# Patient Record
Sex: FEMALE | Race: WHITE | NOT HISPANIC OR LATINO | Employment: OTHER | URBAN - METROPOLITAN AREA
[De-identification: names, ages, dates, MRNs, and addresses within clinical notes are randomized per-mention and may not be internally consistent; named-entity substitution may affect disease eponyms.]

---

## 2017-01-03 ENCOUNTER — TRANSFERRED RECORDS (OUTPATIENT)
Dept: HEALTH INFORMATION MANAGEMENT | Facility: CLINIC | Age: 72
End: 2017-01-03

## 2017-05-26 ENCOUNTER — HEALTH MAINTENANCE LETTER (OUTPATIENT)
Age: 72
End: 2017-05-26

## 2017-08-04 ENCOUNTER — OFFICE VISIT (OUTPATIENT)
Dept: OPHTHALMOLOGY | Facility: CLINIC | Age: 72
End: 2017-08-04
Payer: COMMERCIAL

## 2017-08-04 DIAGNOSIS — H50.10 EXOTROPIA: ICD-10-CM

## 2017-08-04 DIAGNOSIS — H52.4 PRESBYOPIA: ICD-10-CM

## 2017-08-04 DIAGNOSIS — H25.813 COMBINED FORMS OF AGE-RELATED CATARACT OF BOTH EYES: ICD-10-CM

## 2017-08-04 DIAGNOSIS — Z01.01 ENCOUNTER FOR EXAMINATION OF EYES AND VISION WITH ABNORMAL FINDINGS: Primary | ICD-10-CM

## 2017-08-04 PROCEDURE — 92014 COMPRE OPH EXAM EST PT 1/>: CPT | Performed by: OPHTHALMOLOGY

## 2017-08-04 PROCEDURE — 92015 DETERMINE REFRACTIVE STATE: CPT | Performed by: OPHTHALMOLOGY

## 2017-08-04 ASSESSMENT — EXTERNAL EXAM - LEFT EYE: OS_EXAM: NORMAL

## 2017-08-04 ASSESSMENT — VISUAL ACUITY
METHOD: SNELLEN - LINEAR
OS_CC+: -1
OD_CC: 4
OD_CC: 20/25
OS_CC: 20/20
OS_CC: 2
CORRECTION_TYPE: GLASSES

## 2017-08-04 ASSESSMENT — SLIT LAMP EXAM - LIDS
COMMENTS: NORMAL
COMMENTS: NORMAL

## 2017-08-04 ASSESSMENT — REFRACTION_WEARINGRX
OS_HPRISM: 2 BI
OD_AXIS: 175
OD_ADD: +2.48
SPECS_TYPE: PAL
OS_CYLINDER: +0.50
OD_SPHERE: -1.00
OS_SPHERE: -0.75
OS_AXIS: 018
OD_CYLINDER: +0.50
OS_ADD: +2.49

## 2017-08-04 ASSESSMENT — REFRACTION_MANIFEST
OS_ADD: +2.50
OD_ADD: +2.50
OD_CYLINDER: +1.00
OS_SPHERE: -1.00
OS_AXIS: 018
OS_CYLINDER: +0.75
OD_AXIS: 003
OD_SPHERE: -1.00

## 2017-08-04 ASSESSMENT — CUP TO DISC RATIO
OD_RATIO: 0.3
OS_RATIO: 0.3

## 2017-08-04 ASSESSMENT — CONF VISUAL FIELD
OS_NORMAL: 1
OD_NORMAL: 1

## 2017-08-04 ASSESSMENT — TONOMETRY
IOP_METHOD: APPLANATION
OS_IOP_MMHG: 15
OD_IOP_MMHG: 14

## 2017-08-04 ASSESSMENT — REFRACTION_FINALRX: OS_HPRISM: 2 BI

## 2017-08-04 ASSESSMENT — EXTERNAL EXAM - RIGHT EYE: OD_EXAM: NORMAL

## 2017-08-04 NOTE — MR AVS SNAPSHOT
"              After Visit Summary   8/4/2017    Vickie Sherwood    MRN: [id number]           Patient Information     Date Of Birth          1945        Visit Information        Provider Department      8/4/2017 2:00 PM Yousuf Billy MD [facility]        Today's Diagnoses     Encounter for examination of eyes and vision with abnormal findings    -  1    Presbyopia        Myopia, bilateral        Astigmatism of both eyes, unspecified type        Combined forms of age-related cataract, mild both eyes        Exotropia          Care Instructions    Possible posterior vitreous detachment (sudden onset large floater and/or flashing lights) discussed.   Use artificial tears up to 4 times daily both eyes. (Refresh Tears or Systane Ultra/Balance)   Glasses Rx given - optional   Call in April 2018 for an appointment in August 2018 for Complete Exam    Dr. Billy [phone]          Follow-ups after your visit        Who to contact     If you have questions or need follow up information about today's clinic visit or your schedule please contact [facility] directly at [phone].  Normal or non-critical lab and imaging results will be communicated to you by [employer], letter or phone within 4 business days after the clinic has received the results. If you do not hear from us within 7 days, please contact the clinic through [employer] or phone. If you have a critical or abnormal lab result, we will notify you by phone as soon as possible.  Submit refill requests through [employer] or call your pharmacy and they will forward the refill request to us. Please allow 3 business days for your refill to be completed.          Additional Information About Your Visit        MyChart Information     [employer] lets you send messages to your doctor, view your test results, renew your prescriptions, schedule appointments and more. To sign up, go to [email or web address] . Click on \"Log in\" on the left side " "of the screen, which will take you to the Welcome page. Then click on \"Sign up Now\" on the right side of the page.     You will be asked to enter the access code listed below, as well as some personal information. Please follow the directions to create your username and password.     Your access code is: [email or web address]  Expires: 2017  3:29 PM     Your access code will  in 90 days. If you need help or a new code, please call your [facility] clinic or [phone].        Care EveryWhere ID     This is your Care EveryWhere ID. This could be used by other organizations to access your [facility] medical records  [id number]         Blood Pressure from Last 3 Encounters:   07/10/12 135/79   12 114/72   12 109/61    Weight from Last 3 Encounters:   16 58.5 kg (129 lb)   16 62 kg (136 lb 9.6 oz)   12 67.1 kg (148 lb)              We Performed the Following     EYE EXAM (SIMPLE-NONBILLABLE)     REFRACTIVE STATUS        Primary Care Provider Office Phone # Fax #    Jes Sasha Llanos [phone]       [facility] [address] [facility] [address]  [facility] [address]        Equal Access to Services     TOMASA VARNER AH: Hadii aad ku hadasho Soomaali, waaxda luqadaha, qaybta kaalmada adeegyada, waxay idiin hayaan estela kharadwayne griffin . So [facility] [phone].    ATENCIÓN: Si habla español, tiene a preston disposición servicios gratuitos de asistencia lingüística. Llame al [phone].    We comply with applicable federal civil rights laws and [place] laws. We do not discriminate on the basis of race, color, national origin, age, disability sex, sexual orientation or gender identity.            Thank you!     Thank you for choosing [facility]  for your care. Our goal is always to provide you with excellent care. Hearing back from our patients is one way we can continue to improve our services. Please take a few minutes to complete the written survey that you may receive " in the mail after your visit with us. Thank you!             Your Updated Medication List - Protect others around you: Learn how to safely use, store and throw away your medicines at www.disposemymeds.org.          This list is accurate as of: 8/4/17  3:29 PM.  Always use your most recent med list.                   Brand Name Dispense Instructions for use Diagnosis    amLODIPine 10 MG tablet    NORVASC     Take 10 mg by mouth        aspirin 81 MG tablet     100 Tab    ONE DAILY    Routine general medical examination at a health care facility       atorvastatin 20 MG tablet    LIPITOR    135 tablet    Take 1.5 tablets by mouth daily.    Hyperlipidemia LDL goal < 130       diphenhydrAMINE HCl 50 MG Tabs      Take 300 mg by mouth At Bedtime.        docusate sodium 100 MG tablet    COLACE    60 tablet    Take 100 mg by mouth daily    Other constipation       enalapril 5 MG tablet    VASOTEC    93 tablet    Take 1 tablet by mouth daily. Needs to be seen b/4 further refills for hypertension    Hypertension goal BP (blood pressure) < 130/80       FOLBEE PO      Take  by mouth 2 times daily.        haloperidol 10 MG tablet    HALDOL     Take 10 mg by mouth At Bedtime.        hydrOXYzine 50 MG tablet    ATARAX     Take 50 mg by mouth 4 times daily.        oxybutynin 5 MG 24 hr tablet    DITROPAN XL    90 tablet    Take 1 tablet by mouth daily.    Overactive bladder       pilocarpine 5 MG tablet    SALAGEN    30 tablet    Half tablet (2.5mg) by mouth, once daily before bed    Disturbance of salivary secretion, Stomatitis and mucositis       traZODone HCl 300 MG Tabs      300 mg oral at bedtime.

## 2017-08-04 NOTE — PATIENT INSTRUCTIONS
Possible posterior vitreous detachment (sudden onset large floater and/or flashing lights) discussed.   Use artificial tears up to 4 times daily both eyes. (Refresh Tears or Systane Ultra/Balance)   Glasses Rx given - optional   Call in April 2018 for an appointment in August 2018 for Complete Exam    Dr. Billy [phone]

## 2017-08-04 NOTE — PROGRESS NOTES
Current Eye Medications:  None.      Subjective:  Comprehensive Eye Exam.  Occasionally her vision is blurry and her eyes water.       Objective:  See Ophthalmology Exam.       Assessment:  Stable eye exam.      ICD-10-CM    1. Encounter for examination of eyes and vision with abnormal findings Z01.01 REFRACTIVE STATUS   2. Presbyopia H52.4 REFRACTIVE STATUS   3. Combined forms of age-related cataract, mild, both eyes H25.813 EYE EXAM (SIMPLE-NONBILLABLE)   4. Exotropia H50.10         Plan:  Possible posterior vitreous detachment (sudden onset large floater and/or flashing lights) discussed.   Use artificial tears up to 4 times daily both eyes. (Refresh Tears or Systane Ultra/Balance)   Glasses Rx given - optional   Call in April 2018 for an appointment in August 2018 for Complete Exam    Dr. Billy [phone]

## 2017-08-05 PROBLEM — H25.813 COMBINED FORMS OF AGE-RELATED CATARACT OF BOTH EYES: Status: ACTIVE | Noted: 2017-08-05

## 2017-12-26 ENCOUNTER — TELEPHONE (OUTPATIENT)
Dept: ORTHOPEDICS | Facility: CLINIC | Age: 72
End: 2017-12-26

## 2018-01-09 ENCOUNTER — TRANSFERRED RECORDS (OUTPATIENT)
Dept: HEALTH INFORMATION MANAGEMENT | Facility: CLINIC | Age: 73
End: 2018-01-09

## 2018-08-07 ENCOUNTER — OFFICE VISIT (OUTPATIENT)
Dept: OPHTHALMOLOGY | Facility: CLINIC | Age: 73
End: 2018-08-07
Payer: COMMERCIAL

## 2018-08-07 DIAGNOSIS — H25.813 COMBINED FORMS OF AGE-RELATED CATARACT OF BOTH EYES: ICD-10-CM

## 2018-08-07 DIAGNOSIS — H50.10 EXOTROPIA: ICD-10-CM

## 2018-08-07 DIAGNOSIS — Z01.01 ENCOUNTER FOR EXAMINATION OF EYES AND VISION WITH ABNORMAL FINDINGS: Primary | ICD-10-CM

## 2018-08-07 DIAGNOSIS — H52.4 PRESBYOPIA: ICD-10-CM

## 2018-08-07 PROCEDURE — 92014 COMPRE OPH EXAM EST PT 1/>: CPT | Performed by: OPHTHALMOLOGY

## 2018-08-07 PROCEDURE — 92015 DETERMINE REFRACTIVE STATE: CPT | Performed by: OPHTHALMOLOGY

## 2018-08-07 ASSESSMENT — REFRACTION_MANIFEST
OS_CYLINDER: +0.75
OD_SPHERE: -1.00
OS_ADD: +2.50
OD_ADD: +2.50
OS_AXIS: 015
OD_AXIS: 005
OS_SPHERE: -1.00
OD_CYLINDER: +0.75

## 2018-08-07 ASSESSMENT — VISUAL ACUITY
OS_CC: J1
OD_CC: 20/20
OS_CC: 20/20
OD_CC: J1
METHOD: SNELLEN - LINEAR

## 2018-08-07 ASSESSMENT — REFRACTION_WEARINGRX
OD_SPHERE: -1.00
OS_ADD: +2.49
OS_CYLINDER: +0.75
OD_AXIS: 008
OD_CYLINDER: +0.50
OS_HPRISM: 2 BI
SPECS_TYPE: PAL
OS_SPHERE: -0.75
OS_AXIS: 018
OD_ADD: +2.48

## 2018-08-07 ASSESSMENT — EXTERNAL EXAM - RIGHT EYE: OD_EXAM: NORMAL

## 2018-08-07 ASSESSMENT — CONF VISUAL FIELD
OS_NORMAL: 1
OD_NORMAL: 1

## 2018-08-07 ASSESSMENT — TONOMETRY
OD_IOP_MMHG: 18
IOP_METHOD: APPLANATION
OS_IOP_MMHG: 18

## 2018-08-07 ASSESSMENT — CUP TO DISC RATIO
OD_RATIO: 0.3
OS_RATIO: 0.3

## 2018-08-07 ASSESSMENT — SLIT LAMP EXAM - LIDS
COMMENTS: NORMAL
COMMENTS: NORMAL

## 2018-08-07 ASSESSMENT — EXTERNAL EXAM - LEFT EYE: OS_EXAM: NORMAL

## 2018-08-07 NOTE — PATIENT INSTRUCTIONS
Glasses Rx given - optional  Use artificial tears up to 4 times daily both eyes.  (Refresh Tears, Systane Ultra/Balance, or Theratears)  Possible posterior vitreous detachment (sudden onset large floater and/or flashing lights) discussed.  Call in April 2019 for an appointment in August 2019 for Complete Exam    Dr. Billy [phone]

## 2018-08-07 NOTE — LETTER
8/7/2018         RE: Vickie Sherwood  [address]  [place] [address]        Dear Colleague,    Thank you for referring your patient, Vickie Sherwood, to the [facility]. Please see a copy of my visit note below.     Current Eye Medications:  no     Subjective:  Comprehensive eye exam.   Pt states she seems to be seeing fine , has Pals that bows broke on them she uses to read with , she will hold them up to her by hand, pt does not wear any glasses for distance. Pt states had double vision at one time, no longer does.     Objective:  See Ophthalmology Exam.       Assessment:  Stable eye exam.      ICD-10-CM    1. Encounter for examination of eyes and vision with abnormal findings Z01.01 REFRACTIVE STATUS   2. Presbyopia H52.4 REFRACTIVE STATUS   3. Combined forms of age-related cataract, mild, both eyes H25.813 EYE EXAM (SIMPLE-NONBILLABLE)   4. Exotropia H50.10         Plan:  Glasses Rx given - optional  Use artificial tears up to 4 times daily both eyes.  (Refresh Tears, Systane Ultra/Balance, or Theratears)  Possible posterior vitreous detachment (sudden onset large floater and/or flashing lights) discussed.  Call in April 2019 for an appointment in August 2019 for Complete Exam    Dr. Billy [phone]         Again, thank you for allowing me to participate in the care of your patient.        Sincerely,        Yousuf Billy MD

## 2018-08-07 NOTE — PROGRESS NOTES
Current Eye Medications:  no     Subjective:  Comprehensive eye exam.   Pt states she seems to be seeing fine , has Pals that bows broke on them she uses to read with , she will hold them up to her by hand, pt does not wear any glasses for distance. Pt states had double vision at one time, no longer does.     Objective:  See Ophthalmology Exam.       Assessment:  Stable eye exam.      ICD-10-CM    1. Encounter for examination of eyes and vision with abnormal findings Z01.01 REFRACTIVE STATUS   2. Presbyopia H52.4 REFRACTIVE STATUS   3. Combined forms of age-related cataract, mild, both eyes H25.813 EYE EXAM (SIMPLE-NONBILLABLE)   4. Exotropia H50.10         Plan:  Glasses Rx given - optional  Use artificial tears up to 4 times daily both eyes.  (Refresh Tears, Systane Ultra/Balance, or Theratears)  Possible posterior vitreous detachment (sudden onset large floater and/or flashing lights) discussed.  Call in April 2019 for an appointment in August 2019 for Complete Exam    Dr. Billy [phone]

## 2018-08-07 NOTE — MR AVS SNAPSHOT
After Visit Summary   8/7/2018    Vickie Sherwood    MRN: [id number]           Patient Information     Date Of Birth          1945        Visit Information        Provider Department      8/7/2018 3:00 PM Yousuf Billy MD [facility]        Today's Diagnoses     Encounter for examination of eyes and vision with abnormal findings    -  1    Presbyopia          Care Instructions    Glasses Rx given - optional  Use artificial tears up to 4 times daily both eyes.  (Refresh Tears, Systane Ultra/Balance, or Theratears)  Possible posterior vitreous detachment (sudden onset large floater and/or flashing lights) discussed.  Call in April 2019 for an appointment in August 2019 for Complete Exam    Dr. Billy [phone]          Follow-ups after your visit        Who to contact     If you have questions or need follow up information about today's clinic visit or your schedule please contact [facility] directly at [phone].  Normal or non-critical lab and imaging results will be communicated to you by MyChart, letter or phone within 4 business days after the clinic has received the results. If you do not hear from us within 7 days, please contact the clinic through [employer] or phone. If you have a critical or abnormal lab result, we will notify you by phone as soon as possible.  Submit refill requests through [employer] or call your pharmacy and they will forward the refill request to us. Please allow 3 business days for your refill to be completed.          Additional Information About Your Visit        Care EveryWhere ID     This is your Care EveryWhere ID. This could be used by other organizations to access your [place] medical records  [id number]         Blood Pressure from Last 3 Encounters:   07/10/12 135/79   07/05/12 114/72   02/29/12 109/61    Weight from Last 3 Encounters:   08/29/16 58.5 kg (129 lb)   04/25/16 62 kg (136 lb 9.6 oz)   11/16/12 67.1 kg (148 lb)               Today, you had the following     No orders found for display       Primary Care Provider Office Phone # Fax #    Jes Sasha Llanos [phone]       [facility] [address] [facility] [address]  [facility] [address]        Equal Access to Services     TOMASA VARNER : Hadii aad ku hadasho Soomaali, waaxda luqadaha, qaybta kaalmada adeegyada, waxay idiin hayaan adeeg khantoniettash lasocorro . So [facility] [phone].    ATENCIÓN: Si habla español, tiene a preston disposición servicios gratuitos de asistencia lingüística. Luis Fernando al [phone].    We comply with applicable federal civil rights laws and [place] laws. We do not discriminate on the basis of race, color, national origin, age, disability, sex, sexual orientation, or gender identity.            Thank you!     Thank you for choosing [facility]  for your care. Our goal is always to provide you with excellent care. Hearing back from our patients is one way we can continue to improve our services. Please take a few minutes to complete the written survey that you may receive in the mail after your visit with us. Thank you!             Your Updated Medication List - Protect others around you: Learn how to safely use, store and throw away your medicines at www.disposemymeds.org.          This list is accurate as of 8/7/18  4:03 PM.  Always use your most recent med list.                   Brand Name Dispense Instructions for use Diagnosis    amLODIPine 10 MG tablet    NORVASC     Take 10 mg by mouth        aspirin 81 MG tablet     100 Tab    ONE DAILY    Routine general medical examination at a health care facility       atorvastatin 20 MG tablet    LIPITOR    135 tablet    Take 1.5 tablets by mouth daily.    Hyperlipidemia LDL goal < 130       diphenhydrAMINE HCl 50 MG Tabs      Take 300 mg by mouth At Bedtime.        docusate sodium 100 MG tablet    COLACE    60 tablet    Take 100 mg by mouth daily    Other constipation       enalapril  5 MG tablet    VASOTEC    93 tablet    Take 1 tablet by mouth daily. Needs to be seen b/4 further refills for hypertension    Hypertension goal BP (blood pressure) < 130/80       FOLBEE PO      Take  by mouth 2 times daily.        haloperidol 10 MG tablet    HALDOL     Take 10 mg by mouth At Bedtime.        hydrOXYzine 50 MG tablet    ATARAX     Take 50 mg by mouth 4 times daily.        oxybutynin 5 MG 24 hr tablet    DITROPAN XL    90 tablet    Take 1 tablet by mouth daily.    Overactive bladder       pilocarpine 5 MG tablet    SALAGEN    30 tablet    Half tablet (2.5mg) by mouth, once daily before bed    Disturbance of salivary secretion, Stomatitis and mucositis       traZODone HCl 300 MG Tabs      300 mg oral at bedtime.

## 2020-12-29 ENCOUNTER — OFFICE VISIT (OUTPATIENT)
Dept: OPHTHALMOLOGY | Facility: CLINIC | Age: 75
End: 2020-12-29
Payer: COMMERCIAL

## 2020-12-29 DIAGNOSIS — H25.813 COMBINED FORMS OF AGE-RELATED CATARACT OF BOTH EYES: ICD-10-CM

## 2020-12-29 DIAGNOSIS — Z01.01 ENCOUNTER FOR EXAMINATION OF EYES AND VISION WITH ABNORMAL FINDINGS: Primary | ICD-10-CM

## 2020-12-29 DIAGNOSIS — H52.4 PRESBYOPIA: ICD-10-CM

## 2020-12-29 DIAGNOSIS — H50.10 EXOTROPIA: ICD-10-CM

## 2020-12-29 PROCEDURE — 92014 COMPRE OPH EXAM EST PT 1/>: CPT | Performed by: OPHTHALMOLOGY

## 2020-12-29 PROCEDURE — 92015 DETERMINE REFRACTIVE STATE: CPT | Performed by: OPHTHALMOLOGY

## 2020-12-29 ASSESSMENT — EXTERNAL EXAM - RIGHT EYE: OD_EXAM: NORMAL

## 2020-12-29 ASSESSMENT — VISUAL ACUITY
OS_PH_CC: J1
OD_CC: 20/25+3
OS_CC: 20/20
OD_PH_CC: J2
METHOD: SNELLEN - LINEAR

## 2020-12-29 ASSESSMENT — EXTERNAL EXAM - LEFT EYE: OS_EXAM: NORMAL

## 2020-12-29 ASSESSMENT — REFRACTION_MANIFEST
OS_SPHERE: -1.00
OD_ADD: +2.75
OS_ADD: +2.75
OD_AXIS: 005
OS_AXIS: 015
OD_CYLINDER: +0.75
OD_SPHERE: -0.50
OS_CYLINDER: +0.75

## 2020-12-29 ASSESSMENT — REFRACTION_WEARINGRX
OS_CYLINDER: +1.00
OD_ADD: +2.50
OD_AXIS: 008
OD_CYLINDER: +0.75
OS_SPHERE: -1.00
OS_ADD: +2.50
OS_AXIS: 015
OD_SPHERE: -1.00
SPECS_TYPE: PAL

## 2020-12-29 ASSESSMENT — CONF VISUAL FIELD
OD_NORMAL: 1
OS_NORMAL: 1

## 2020-12-29 ASSESSMENT — SLIT LAMP EXAM - LIDS
COMMENTS: NORMAL
COMMENTS: NORMAL

## 2020-12-29 ASSESSMENT — TONOMETRY
OS_IOP_MMHG: 15
OD_IOP_MMHG: 17
IOP_METHOD: APPLANATION

## 2020-12-29 ASSESSMENT — CUP TO DISC RATIO
OD_RATIO: 0.3
OS_RATIO: 0.3

## 2020-12-29 NOTE — PATIENT INSTRUCTIONS
Possible posterior vitreous detachment (sudden onset large floater and/or flashing lights) both eyes discussed.   Glasses Rx given - optional   Use artificial tears up to 4 times daily both eyes as needed (Refresh Tears, Systane Ultra/Balance, or Theratears)   Call in August 2021 for an appointment in December 2021 for Complete Exam    Dr. Billy [phone]

## 2020-12-29 NOTE — PROGRESS NOTES
Current Eye Medications: no      Subjective:  Comprehensive eye exam.   Pt reports that in the  evening it is harder to see to read.     Objective:  See Ophthalmology Exam.       Assessment:  Stable eye exam.      ICD-10-CM    1. Encounter for examination of eyes and vision with abnormal findings  Z01.01    2. Presbyopia  H52.4 REFRACTIVE STATUS     EYE EXAM (SIMPLE-NONBILLABLE)   3. Combined forms of age-related cataract, mild, both eyes  H25.813    4. Exotropia  H50.10         Plan:  Possible posterior vitreous detachment (sudden onset large floater and/or flashing lights) both eyes discussed.   Glasses Rx given - optional   Use artificial tears up to 4 times daily both eyes as needed (Refresh Tears, Systane Ultra/Balance, or Theratears)   Call in August 2021 for an appointment in December 2021 for Complete Exam    Dr. Billy [phone]

## 2020-12-29 NOTE — LETTER
12/29/2020         RE: Vickie Sherwood  [address]  [place] [address]        Dear Colleague,    Thank you for referring your patient, Vickie Sherwood, to the [facility]. Please see a copy of my visit note below.     Current Eye Medications: no      Subjective:  Comprehensive eye exam.   Pt reports that in the  evening it is harder to see to read.     Objective:  See Ophthalmology Exam.       Assessment:  Stable eye exam.      ICD-10-CM    1. Encounter for examination of eyes and vision with abnormal findings  Z01.01    2. Presbyopia  H52.4 REFRACTIVE STATUS     EYE EXAM (SIMPLE-NONBILLABLE)   3. Combined forms of age-related cataract, mild, both eyes  H25.813    4. Exotropia  H50.10         Plan:  Possible posterior vitreous detachment (sudden onset large floater and/or flashing lights) both eyes discussed.   Glasses Rx given - optional   Use artificial tears up to 4 times daily both eyes as needed (Refresh Tears, Systane Ultra/Balance, or Theratears)   Call in August 2021 for an appointment in December 2021 for Complete Exam    Dr. Billy [phone]           Again, thank you for allowing me to participate in the care of your patient.        Sincerely,        Yousuf Billy MD

## 2021-02-17 ENCOUNTER — TELEPHONE (OUTPATIENT)
Dept: OPHTHALMOLOGY | Facility: CLINIC | Age: 76
End: 2021-02-17

## 2021-02-18 NOTE — TELEPHONE ENCOUNTER
"Patient called stating that she got blurry vision at the store all of a sudden and her eyes started \"jiggling\". She states it was accompanied by a headache. She then stated that she got a few floaters too with bid dark areas in her vision. She would like a call back to discuss these symptoms as soon as possible. She would like a call back at: [phone].    Thank You,    Rickie Barraza  "
Called patient twice; no answer.  Left message that if new floater in one eye, should be seen for dilation.  Yousuf Billy M.D.  [phone]    
None known

## 2021-03-08 ENCOUNTER — OFFICE VISIT (OUTPATIENT)
Dept: OPHTHALMOLOGY | Facility: CLINIC | Age: 76
End: 2021-03-08
Payer: COMMERCIAL

## 2021-03-08 DIAGNOSIS — G43.109 MIGRAINE EQUIVALENT SYNDROME: Primary | ICD-10-CM

## 2021-03-08 PROCEDURE — 92012 INTRM OPH EXAM EST PATIENT: CPT | Performed by: OPHTHALMOLOGY

## 2021-03-08 ASSESSMENT — TONOMETRY
OD_IOP_MMHG: 18
OS_IOP_MMHG: 17
IOP_METHOD: APPLANATION

## 2021-03-08 ASSESSMENT — SLIT LAMP EXAM - LIDS
COMMENTS: NORMAL
COMMENTS: NORMAL

## 2021-03-08 ASSESSMENT — VISUAL ACUITY
OD_CC: 20/25
METHOD: SNELLEN - LINEAR
CORRECTION_TYPE: GLASSES
OS_CC: 20/20

## 2021-03-08 ASSESSMENT — EXTERNAL EXAM - RIGHT EYE: OD_EXAM: NORMAL

## 2021-03-08 ASSESSMENT — CONF VISUAL FIELD
OD_NORMAL: 1
OS_NORMAL: 1

## 2021-03-08 ASSESSMENT — CUP TO DISC RATIO
OS_RATIO: 0.3
OD_RATIO: 0.3

## 2021-03-08 ASSESSMENT — EXTERNAL EXAM - LEFT EYE: OS_EXAM: NORMAL

## 2021-03-08 NOTE — PROGRESS NOTES
Current Eye Medications:  None     Subjective:  TA/Dil (floaters).   Patient states that she has had episode 2/2/21 of vision becoming dark lasting for approximately 15 minutes.  Then she had additional episodes lasting up to 1/2 hour where vision jiggled and she saw scintillating lights No headaches, but had a short lived pain at back of head lasting only a few seconds.    Episodes were binocular, not monocular.     Objective:  See Ophthalmology Exam.       Assessment:  Possible migraine equivalent episodes.      Plan:  Your eye exam today is unremarkable.  The episodes may be migraine equivalents or other brain related phenomena.  Check your blood pressure frequently.  Start an 81 mg aspirin if okay with your PCP.  Return visit as planned in Dec 2021.  Yousuf Billy M.D.  [phone]

## 2021-03-08 NOTE — PATIENT INSTRUCTIONS
Your eye exam today is unremarkable.  The episodes may be migraine equivalents or other brain related phenomena.  Start an 81 mg aspirin if okay with your PCP.  Return visit as planned in Dec 2021.  Yousuf Billy M.D.  [phone]

## 2021-03-08 NOTE — LETTER
3/8/2021         RE: Vickie Sherwood  [address]  [place] [address]        Dear Colleague,    Thank you for referring your patient, Vickie Sherwood, to the [facility]. Please see a copy of my visit note below.     Current Eye Medications:  None     Subjective:  TA/Dil (floaters).   Patient states that she has had episode 2/2/21 of vision becoming dark lasting for approximately 15 minutes.  Then she had additional episodes lasting up to 1/2 hour where vision jiggled and she saw scintillating lights No headaches, but had a short lived pain at back of head lasting only a few seconds.     Objective:  See Ophthalmology Exam.       Assessment:  Possible migraine equivalent episodes.      Plan:  Your eye exam today is unremarkable.  The episodes may be migraine equivalents or other brain related phenomena.  Check your blood pressure frequently.  Start an 81 mg aspirin if okay with your PCP.  Return visit as planned in Dec 2021.  Yousuf Billy M.D.  [phone]           Again, thank you for allowing me to participate in the care of your patient.        Sincerely,        Yousuf Billy MD

## 2021-03-09 PROBLEM — G43.109 MIGRAINE EQUIVALENT SYNDROME: Status: ACTIVE | Noted: 2021-03-09

## 2021-03-10 ENCOUNTER — DOCUMENTATION ONLY (OUTPATIENT)
Dept: OPHTHALMOLOGY | Facility: CLINIC | Age: 76
End: 2021-03-10

## 2023-07-31 ENCOUNTER — OFFICE VISIT (OUTPATIENT)
Dept: OPHTHALMOLOGY | Facility: CLINIC | Age: 78
End: 2023-07-31
Payer: COMMERCIAL

## 2023-07-31 DIAGNOSIS — Z01.01 ENCOUNTER FOR EXAMINATION OF EYES AND VISION WITH ABNORMAL FINDINGS: Primary | ICD-10-CM

## 2023-07-31 DIAGNOSIS — H25.813 COMBINED FORMS OF AGE-RELATED CATARACT OF BOTH EYES: ICD-10-CM

## 2023-07-31 DIAGNOSIS — H35.52 MACULAR PIGMENT DEPOSIT: ICD-10-CM

## 2023-07-31 DIAGNOSIS — H52.4 PRESBYOPIA: ICD-10-CM

## 2023-07-31 DIAGNOSIS — H35.61 RETINAL HEMORRHAGE OF RIGHT EYE: ICD-10-CM

## 2023-07-31 PROCEDURE — 92015 DETERMINE REFRACTIVE STATE: CPT | Performed by: OPHTHALMOLOGY

## 2023-07-31 PROCEDURE — 92134 CPTRZ OPH DX IMG PST SGM RTA: CPT | Performed by: OPHTHALMOLOGY

## 2023-07-31 PROCEDURE — 92014 COMPRE OPH EXAM EST PT 1/>: CPT | Performed by: OPHTHALMOLOGY

## 2023-07-31 RX ORDER — DIVALPROEX SODIUM 250 MG/1
750 TABLET, EXTENDED RELEASE ORAL AT BEDTIME
COMMUNITY
Start: 2023-07-02

## 2023-07-31 RX ORDER — QUETIAPINE FUMARATE 25 MG/1
25 TABLET, FILM COATED ORAL AT BEDTIME
COMMUNITY
Start: 2023-06-21

## 2023-07-31 RX ORDER — FLUTICASONE FUROATE AND VILANTEROL TRIFENATATE 100; 25 UG/1; UG/1
1 POWDER RESPIRATORY (INHALATION) DAILY
COMMUNITY
Start: 2023-07-17

## 2023-07-31 RX ORDER — LORAZEPAM 0.5 MG/1
0.5 TABLET ORAL AT BEDTIME
COMMUNITY
Start: 2023-07-05

## 2023-07-31 RX ORDER — ATORVASTATIN CALCIUM 10 MG/1
10 TABLET, FILM COATED ORAL DAILY
COMMUNITY
Start: 2023-05-30

## 2023-07-31 RX ORDER — DOCUSATE SODIUM 50MG AND SENNOSIDES 8.6MG 8.6; 5 MG/1; MG/1
1 TABLET, FILM COATED ORAL 2 TIMES DAILY PRN
COMMUNITY
Start: 2023-04-10

## 2023-07-31 RX ORDER — ASENAPINE 10 MG/1
10 TABLET SUBLINGUAL 2 TIMES DAILY
COMMUNITY
Start: 2023-06-27

## 2023-07-31 RX ORDER — ALBUTEROL SULFATE 90 UG/1
1-2 AEROSOL, METERED RESPIRATORY (INHALATION) EVERY 4 HOURS PRN
COMMUNITY

## 2023-07-31 ASSESSMENT — CONF VISUAL FIELD
OS_INFERIOR_TEMPORAL_RESTRICTION: 0
OS_NORMAL: 1
OD_SUPERIOR_NASAL_RESTRICTION: 0
OD_NORMAL: 1
OD_INFERIOR_TEMPORAL_RESTRICTION: 0
OD_SUPERIOR_TEMPORAL_RESTRICTION: 0
OS_SUPERIOR_NASAL_RESTRICTION: 0
OS_INFERIOR_NASAL_RESTRICTION: 0
METHOD: COUNTING FINGERS
OD_INFERIOR_NASAL_RESTRICTION: 0
OS_SUPERIOR_TEMPORAL_RESTRICTION: 0

## 2023-07-31 ASSESSMENT — REFRACTION_MANIFEST
OS_ADD: +2.50
OS_CYLINDER: +0.75
OS_AXIS: 005
OD_CYLINDER: +0.75
OS_SPHERE: -1.50
OD_AXIS: 005
OD_ADD: +2.50
OD_SPHERE: -0.50

## 2023-07-31 ASSESSMENT — CUP TO DISC RATIO
OD_RATIO: 0.3
OS_RATIO: 0.3

## 2023-07-31 ASSESSMENT — EXTERNAL EXAM - RIGHT EYE: OD_EXAM: NORMAL

## 2023-07-31 ASSESSMENT — VISUAL ACUITY
OS_CC+: -2
OS_CC: 20/30
METHOD: SNELLEN - LINEAR
OD_PH_CC: 20/40
OD_CC+: -2
OD_CC: 20/40
CORRECTION_TYPE: GLASSES

## 2023-07-31 ASSESSMENT — REFRACTION_WEARINGRX
OD_ADD: +2.40
OS_SPHERE: -1.00
OD_CYLINDER: +0.75
OS_AXIS: 015
OD_SPHERE: -1.00
OS_ADD: +2.40
SPECS_TYPE: PAL
OS_CYLINDER: +0.75
OD_AXIS: 003

## 2023-07-31 ASSESSMENT — EXTERNAL EXAM - LEFT EYE: OS_EXAM: NORMAL

## 2023-07-31 ASSESSMENT — SLIT LAMP EXAM - LIDS
COMMENTS: 1+ DERMATOCHALASIS
COMMENTS: 1+ DERMATOCHALASIS

## 2023-07-31 ASSESSMENT — TONOMETRY
IOP_METHOD: APPLANATION
OS_IOP_MMHG: 13
OD_IOP_MMHG: 13

## 2023-07-31 NOTE — PROGRESS NOTES
" Current Eye Medications:  none     Subjective:  comprehensive eye exam - glasses work well for distance and near.  Gets intermittent discomfort (believes right eye, not sure) lasting a few seconds, self resolves. Not using drops.     Only gets double if laying down and watching tv to the side - no double vision otherwise.  Does notice wavy lines when looking at air conditioner unit that has little small squares. Wavy lines do not move.      Objective:  See Ophthalmology Exam.       Assessment:  Isolated peripheral retinal blot heme right eye; otherwise stable eye exam.      ICD-10-CM    1. Encounter for examination of eyes and vision with abnormal findings  Z01.01       2. Presbyopia  H52.4       3. Combined forms of age-related cataract, mild, both eyes  H25.813       4. 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 Retinal hemorrhage of right eye  H35.61            Plan:  Glasses prescription given - optional  May use artificial tears up to four times a day (like Refresh Optive, Systane Balance, TheraTears, or generic artificial tears are ok. Avoid \"get the red out\" drops).   Possible posterior vitreous detachment (sudden onset large floater and/or flashing lights) both eyes discussed.  Obtain OCT today - will call with any concerns.    Call in March 2024 for an appointment in July 2024 for Complete Exam    Dr. Billy [phone]         "

## 2023-07-31 NOTE — LETTER
"    7/31/2023         RE: Vickie Sherwood  [address]  [place] [address]        Dear Colleague,    Thank you for referring your patient, Vickie Sherwood, to the [facility]. Please see a copy of my visit note below.     Current Eye Medications:  none     Subjective:  comprehensive eye exam - glasses work well for distance and near.  Gets intermittent discomfort (believes right eye, not sure) lasting a few seconds, self resolves. Not using drops.     Only gets double if laying down and watching tv to the side - no double vision otherwise.  Does notice wavy lines when looking at air conditioner unit that has little small squares. Wavy lines do not move.      Objective:  See Ophthalmology Exam.       Assessment:  Isolated peripheral retinal blot heme right eye; otherwise stable eye exam.      ICD-10-CM    1. Encounter for examination of eyes and vision with abnormal findings  Z01.01       2. Presbyopia  H52.4       3. Combined forms of age-related cataract, mild, both eyes  H25.813       4. 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 Retinal hemorrhage of right eye  H35.61            Plan:  Glasses prescription given - optional  May use artificial tears up to four times a day (like Refresh Optive, Systane Balance, TheraTears, or generic artificial tears are ok. Avoid \"get the red out\" drops).   Possible posterior vitreous detachment (sudden onset large floater and/or flashing lights) both eyes discussed.  Obtain OCT today - will call with any concerns.    Call in March 2024 for an appointment in July 2024 for Complete Exam    Dr. Billy [phone]         Again, thank you for allowing me to participate in the care of your patient.        Sincerely,        Yousuf Billy MD  "

## 2023-07-31 NOTE — PATIENT INSTRUCTIONS
"Glasses prescription given - optional  May use artificial tears up to four times a day (like Refresh Optive, Systane Balance, TheraTears, or generic artificial tears are ok. Avoid \"get the red out\" drops).   Possible posterior vitreous detachment (sudden onset large floater and/or flashing lights) both eyes discussed.  Obtain OCT today - will call with any concerns.    Call in March 2024 for an appointment in July 2024 for Complete Exam    Dr. Billy [phone]    "

## 2023-08-26 PROBLEM — H35.52 MACULAR PIGMENT DEPOSIT: Status: ACTIVE | Noted: 2023-08-26

## 2024-04-29 ENCOUNTER — TELEPHONE (OUTPATIENT)
Dept: OPHTHALMOLOGY | Facility: CLINIC | Age: 79
End: 2024-04-29
Payer: COMMERCIAL

## 2024-04-29 NOTE — TELEPHONE ENCOUNTER
[facility] Call Center    Phone Message    May a detailed message be left on voicemail: yes     Reason for Call: Symptoms or Concerns     If patient has red-flag symptoms, warm transfer to triage line    Current symptom or concern: gradual eye pain     Symptoms have been present for:  2-4 month(s)    Has patient previously been seen for this? No    By : Dr Billy    Date: 7/31/2023    Are there any new or worsening symptoms? Yes: Patient would like Dr Billy to know that she's having some gradual eye pain that comes and goes but that it doesn't hurt much.    Patient can be reached at [phone]. Thank you.    Action Taken: Message routed to:  Other: [facility]    Travel Screening: Not Applicable

## 2024-04-29 NOTE — TELEPHONE ENCOUNTER
Spoke with patient - she is having some discomfort in each eye which has been having more often (usually every day).  The discomfort lasts about one second, then subsides.  No vision changes.  Other people have noticed some mild redness of her eyes.  She is not using any drops.    I explained to her that Dr. Billy recommended artificial tears four times a day at her last visit.  I gave her the name of Refresh and Systane.  She will try this and let us know if her symptoms worsen.   She has a Comprehensive Eye Exam scheduled with Dr. Billy on 8-1-24.

## 2024-07-31 ENCOUNTER — TELEPHONE (OUTPATIENT)
Dept: OPHTHALMOLOGY | Facility: CLINIC | Age: 79
End: 2024-07-31
Payer: COMMERCIAL

## 2024-07-31 NOTE — TELEPHONE ENCOUNTER
Explained to patient that it is important to keep annual eye exams - has previous diagnosis of cataracts and macula changes. Pt also mentions that her  is blind of syphilis, states that he also gave pt her syphilis.     Strongly recommend pt keep her appt on 8/29/24 and to discuss this with provider at next visit in 1 mo. (Denies any vision changes as compared to last year).

## 2024-07-31 NOTE — TELEPHONE ENCOUNTER
[facility] Call Center    Phone Message    May a detailed message be left on voicemail: yes     Reason for Call: Other: Pt is requesting to speak with someone on 's team. States she'll like to know why  wants her coming in every year     Please reach out to pt and advise.    Thank you!    Action Taken: Message routed to:  [facility] eye    Travel Screening: Not Applicable     Date of Service:

## 2024-08-29 ENCOUNTER — OFFICE VISIT (OUTPATIENT)
Dept: OPHTHALMOLOGY | Facility: CLINIC | Age: 79
End: 2024-08-29
Payer: COMMERCIAL

## 2024-08-29 DIAGNOSIS — H52.4 PRESBYOPIA: ICD-10-CM

## 2024-08-29 DIAGNOSIS — Z01.01 ENCOUNTER FOR EXAMINATION OF EYES AND VISION WITH ABNORMAL FINDINGS: Primary | ICD-10-CM

## 2024-08-29 DIAGNOSIS — H35.52 MACULAR PIGMENT DEPOSIT: ICD-10-CM

## 2024-08-29 DIAGNOSIS — H25.813 COMBINED FORMS OF AGE-RELATED CATARACT OF BOTH EYES: ICD-10-CM

## 2024-08-29 PROCEDURE — 92015 DETERMINE REFRACTIVE STATE: CPT | Performed by: OPHTHALMOLOGY

## 2024-08-29 PROCEDURE — 92014 COMPRE OPH EXAM EST PT 1/>: CPT | Performed by: OPHTHALMOLOGY

## 2024-08-29 ASSESSMENT — VISUAL ACUITY
METHOD: SNELLEN - LINEAR
OS_CC: 20/25
OS_CC: J1 (+3)
OD_CC: J1
OD_CC+: +3
OD_CC: 20/30

## 2024-08-29 ASSESSMENT — CONF VISUAL FIELD
OD_NORMAL: 1
OD_SUPERIOR_NASAL_RESTRICTION: 0
OS_SUPERIOR_NASAL_RESTRICTION: 0
OD_SUPERIOR_TEMPORAL_RESTRICTION: 0
OS_INFERIOR_NASAL_RESTRICTION: 0
OD_INFERIOR_TEMPORAL_RESTRICTION: 0
OS_INFERIOR_TEMPORAL_RESTRICTION: 0
OS_NORMAL: 1
OS_SUPERIOR_TEMPORAL_RESTRICTION: 0
OD_INFERIOR_NASAL_RESTRICTION: 0

## 2024-08-29 ASSESSMENT — TONOMETRY
OD_IOP_MMHG: 14
OS_IOP_MMHG: 12
IOP_METHOD: APPLANATION

## 2024-08-29 ASSESSMENT — REFRACTION_MANIFEST
OD_ADD: +3.00
OD_CYLINDER: +1.00
OS_ADD: +3.00
OD_SPHERE: -0.25
OS_SPHERE: -1.00
OS_AXIS: 175
OS_CYLINDER: +0.50
OD_AXIS: 165

## 2024-08-29 ASSESSMENT — REFRACTION_WEARINGRX
OD_CYLINDER: +0.75
OD_SPHERE: -0.50
OS_ADD: +2.50
OD_ADD: +2.50
OS_CYLINDER: +1.00
OD_AXIS: 178
OS_SPHERE: -1.50
OS_AXIS: 018
SPECS_TYPE: PAL

## 2024-08-29 ASSESSMENT — CUP TO DISC RATIO
OD_RATIO: 0.3
OS_RATIO: 0.3

## 2024-08-29 ASSESSMENT — EXTERNAL EXAM - LEFT EYE: OS_EXAM: NORMAL

## 2024-08-29 ASSESSMENT — EXTERNAL EXAM - RIGHT EYE: OD_EXAM: NORMAL

## 2024-08-29 ASSESSMENT — SLIT LAMP EXAM - LIDS
COMMENTS: 1+ DERMATOCHALASIS
COMMENTS: 1+ DERMATOCHALASIS

## 2024-08-29 NOTE — PROGRESS NOTES
" Current Eye Medications:  Artificial Tears both eyes as needed      Subjective:  Patient says her eyes get dry and she uses artificial tears until they feel better. She hasn't really noticed any visual changes. No other ocular concerns.      Objective:  See Ophthalmology Exam.       Assessment:  Mild worsening of cataracts both eyes.  Retinal heme right eye resolved.  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      ICD-10-CM    1. Encounter for examination of eyes and vision with abnormal findings  Z01.01       2. Presbyopia  H52.4       3. Combined forms of age-related cataract, mild-mod, both eyes  H25.813       4. 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 Avoid \"get the red out\" drops).     Take a multiple vitamin or \"eye vitamin\" daily (AREDS2).    Protect your eyes outdoors from ultraviolet rays with sunglasses and/or brimmed hat.  Have spinach (cooked or raw), colorful fruits, walnuts, hazelnuts, almonds in your diet.  Monitor the vision in each eye weekly - call if any sudden persistent changes.    Possible posterior vitreous detachment (sudden onset large floater and/or flashing lights) both eyes discussed.    Call in April 2025 for an appointment in August 2025 for Complete Exam    Dr. Billy [phone]       "

## 2024-08-29 NOTE — PATIENT INSTRUCTIONS
"Glasses prescription given - optional    May use artificial tears up to four times a day (like Refresh Optive, Systane Balance, TheraTears, or generic artificial tears are ok. Avoid \"get the red out\" drops).     Take a multiple vitamin or \"eye vitamin\" daily (AREDS2).    Protect your eyes outdoors from ultraviolet rays with sunglasses and/or brimmed hat.  Have spinach (cooked or raw), colorful fruits, walnuts, hazelnuts, almonds in your diet.  Monitor the vision in each eye weekly - call if any sudden persistent changes.    Possible posterior vitreous detachment (sudden onset large floater and/or flashing lights) both eyes discussed.    Call in April 2025 for an appointment in August 2025 for Complete Exam    Dr. Billy [phone]    "

## 2024-08-29 NOTE — LETTER
"8/29/2024      Vickie Sherwood  [address]  [facility] [address]      Dear Colleague,    Thank you for referring your patient, Vickie Sherwood, to the [facility]. Please see a copy of my visit note below.     Current Eye Medications:  Artificial Tears both eyes as needed      Subjective:  Patient says her eyes get dry and she uses artificial tears until they feel better. She hasn't really noticed any visual changes. No other ocular concerns.      Objective:  See Ophthalmology Exam.       Assessment:  Mild worsening of cataracts both eyes.  Retinal heme right eye resolved.  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      ICD-10-CM    1. Encounter for examination of eyes and vision with abnormal findings  Z01.01       2. Presbyopia  H52.4       3. Combined forms of age-related cataract, mild-mod, both eyes  H25.813       4. 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 Avoid \"get the red out\" drops).     Take a multiple vitamin or \"eye vitamin\" daily (AREDS2).    Protect your eyes outdoors from ultraviolet rays with sunglasses and/or brimmed hat.  Have spinach (cooked or raw), colorful fruits, walnuts, hazelnuts, almonds in your diet.  Monitor the vision in each eye weekly - call if any sudden persistent changes.    Possible posterior vitreous detachment (sudden onset large floater and/or flashing lights) both eyes discussed.    Call in April 2025 for an appointment in August 2025 for Complete Exam    Dr. Billy [phone]         Again, thank you for allowing me to participate in the care of your patient.        Sincerely,        Yousuf Billy MD  "